# Patient Record
(demographics unavailable — no encounter records)

---

## 2024-10-22 NOTE — ASSESSMENT
[FreeTextEntry1] : 34 yo female with class 2 obesity requires medical weight loss therapy due to weight history and the positive effects weight loss can have on comorbid condition of elevated cholesterol :  -Increase Zepbound to 5mg    -Will also f/u pcp for a review of her labs  -encouraged increased PA -encouraged to increase fiber and try metamucil if constipation becomes more of an issue -Follow up 4 weeks

## 2024-10-22 NOTE — HISTORY OF PRESENT ILLNESS
[FreeTextEntry1] : Anti-obesity medications: Zepbound Obesity medication side effects:   Bariatric surgery history: no  Obesity co-morbidities: Co-morbidities improved or resolved:  highest adult weight:  250 lowest adult weight: 155  goal weight: 180-200  last weight:  247 current weight:  she doesn't weight herself  other pmhx: constipation (uses kiwi, colo wnl) depression (in therapy) pshx; c section  family: mom and dad are healthy no dm, htn  social: no smoke or drink   32 yo female with class 2 obesity presents for weight management, she started Zepbound in September.  She has a little constipation but is still going to start colace. She had a little nausea but nothing excessive. She also notes increased thirst and is drinking a lot of water. She also feels that she is losing some inches.  This morning she had eggs, spinach and yogurt and granola with fresh berries. She used to always skip her breakfast and she no longer does so. Culturally she's been trained to finish her plate always. She snacks on water/nuts/berries.  She focuses on protein.  Her grandmother passes and her daughter had a bday so she hasn't increased her PA as much as she'd like it to be but she tries/she gets up and walks around.   from my last note:  Social; sedentary, works m-f 8 to 4  Sleep: she has a small child so sleep is an issue, she snores but her  does not witness apnea, she does not wake up choking/gasping  PA: no planned activity /she used to be an athlete volleyball, track, basketball

## 2024-11-26 NOTE — ASSESSMENT
[FreeTextEntry1] : 32 yo female with class 2 obesity, BMI 37, requires medical weight loss therapy due to weight history and the positive effects weight loss can have on comorbid condition of elevated cholesterol :  - continue with Zepbound to 5mg    - f/u pcp for a review of her labs  -encouraged increased PA as she's planning to do  - miralax l for constipation  -Follow up 3 weeks

## 2024-11-26 NOTE — HISTORY OF PRESENT ILLNESS
[FreeTextEntry1] : Anti-obesity medications: Zepbound Obesity medication side effects:   Bariatric surgery history: no  Obesity co-morbidities: Co-morbidities improved or resolved:  highest adult weight:  250 lowest adult weight: 155  goal weight: 180-200  last weight:  247 current weight:  240  other pmhx: constipation (uses kiwi, colo wnl) depression (in therapy) pshx; c section  family: mom and dad are healthy no dm, htn  social: no smoke or drink   34 yo female with class 2 obesity presents for weight management, she started Zepbound in September.  She is on Zepbound 5mg and feels amazing. She has less fatigue, feels more energized, less joint pain going up and down stairs.  Her skin is glowing. She is still having some constipation and incorporated more fiber and metamucil, she's drinking. She is a little off the few days because her 's schedule is throwing her off. She walks and does some light weight lifting. She is going to invest in a rowing machine.     from my last note:  Social; sedentary, works m-f 8 to 4  Sleep: she has a small child so sleep is an issue, she snores but her  does not witness apnea, she does not wake up choking/gasping  PA: no planned activity /she used to be an athlete volleyball, track, basketball

## 2024-12-31 NOTE — ASSESSMENT
[FreeTextEntry1] : 34 yo female with class 2 obesity, BMI 37, requires medical weight loss therapy due to weight history and the positive effects weight loss can have on comorbid condition of elevated cholesterol :  - Agrees to increase Zep to 7.5mg   -encouraged increase PA - cw rowing machine  -cw miralax l for constipation  -Follow up 3 weeks (or in feb if she refills the 7.5mg dose)

## 2024-12-31 NOTE — HISTORY OF PRESENT ILLNESS
[FreeTextEntry1] : Anti-obesity medications: Zepbound Obesity medication side effects:   Bariatric surgery history: no  Obesity co-morbidities: Co-morbidities improved or resolved:  highest adult weight:  250 lowest adult weight: 155  goal weight: 180-200  last weight:  240 current weight:  237.4 other pmhx: constipation (uses kiwi, colo wnl) depression (in therapy) pshx; c section  family: mom and dad are healthy no dm, htn  social: no smoke or drink   32 yo female with class 2 obesity presents for weight management, she started Zepbound in September.  She is on Zepbound 5mg and is still doing  really well. Her last shot was about 2 weeks ago (she wanted to see me before she got a refill). She feels like she can fly up and down the steps, she takes 10 minute walks - gets up to get a patients chart or whatever excuse she has. She got a rowing machine.  Her constipation is better.   from my last note:  Social; sedentary, works m-f 8 to 4  Sleep: she has a small child so sleep is an issue, she snores but her  does not witness apnea, she does not wake up choking/gasping  PA: no planned activity /she used to be an athlete volleyball, track, basketball

## 2025-01-28 NOTE — ASSESSMENT
[FreeTextEntry1] : 32 yo female with class 2 obesity, BMI 37, requires medical weight loss therapy due to weight history and the positive effects weight loss can have on comorbid condition of elevated cholesterol :  - Agrees to continue with Zep  7.5mg  - she has a refill pending and will let me know if she wants to stay on this dose or increase to 10mg before her next visit  -encouraged to cw walking routine, rowing machine when needed  -cw lower calorie diet - follow up 6 weeks

## 2025-01-28 NOTE — HISTORY OF PRESENT ILLNESS
[FreeTextEntry1] : Anti-obesity medications: Zepbound Obesity medication side effects:   constipation Bariatric surgery history: no  Obesity co-morbidities: Co-morbidities improved or resolved:  highest adult weight:  250 lowest adult weight: 155  goal weight: 180-200  last weight:  237 current weight:  232 other pmhx: constipation (uses kiwi, colo wnl) depression (in therapy) pshx; c section  family: mom and dad are healthy no dm, htn  social: no smoke or drink   32 yo female with class 2 obesity presents for weight management, she started Zepbound in September.  She is on Zepbound 7.5mg and is doing really well. She feels great.  She has started walking every morning, during the weekdays she takes a 30-45 min walk. She got a rowing machine but somedays she doesn't get a chance to use it.   Mentally she is feeling much better.  She's increasing her fruit intake. Constipation is better with the fruit and the walking.   from my last note:  Social; sedentary, works m-f 8 to 4  Sleep: she has a small child so sleep is an issue, she snores but her  does not witness apnea, she does not wake up choking/gasping  PA: no planned activity /she used to be an athlete volleyball, track, basketball

## 2025-03-03 NOTE — END OF VISIT
[TextEntry] : IVale, am scribing for and the presence of Dr. Nighat Null the following sections HISTORY OF PRESENT ILLNESS, PAST MEDICAL/FAMILY/SOCIAL HISTORY; REVIEW OF SYSTEMS; PHYSICAL EXAM; DISPOSITION.

## 2025-03-04 NOTE — HISTORY OF PRESENT ILLNESS
[FreeTextEntry1] : Anti-obesity medications: Zepbound Obesity medication side effects:  mild  constipation Bariatric surgery history: no  Obesity co-morbidities: Co-morbidities improved or resolved:  highest adult weight:  250 goal weight: 180-200  other pmhx: constipation (uses kiwi, colo wnl) depression (in therapy) pshx; c section  family: mom and dad are healthy no dm, htn  social: no smoke or drink   34 yo female with class 2 obesity presents for weight management, she started Zepbound in September.  She is on Zepbound 7.5mg and is still doing really well. She feels great.  She has been keeping up with her protein intake, fruit and vege intake, water intake, walking more and using her rowing machine (~ 150 min a week).  Mentally she is feeling much better.   from my last note:  Social; sedentary, works m-f 8 to 4  Sleep: she has a small child so sleep is an issue, she snores but her  does not witness apnea, she does not wake up choking/gasping  PA: no planned activity /she used to be an athlete volleyball, track, basketball

## 2025-03-04 NOTE — REASON FOR VISIT
[Other Location: e.g. School (Enter Location, City,State)___] : at [unfilled], at the time of the visit. [Other Location: e.g. Home (Enter Location, City,State)___] : at [unfilled] [Patient] : the patient [This encounter was initiated by telehealth (audio with video) and converted to telephone (audio only) due to technical difficulties.] : This encounter was initiated by telehealth (audio with video) and converted to telephone (audio only) due to technical difficulties. [FreeTextEntry2] : She had issues with her video link

## 2025-03-04 NOTE — ASSESSMENT
[FreeTextEntry1] : 32 yo female with class 2 obesity, requires medical weight loss therapy due to weight history and the positive effects weight loss can have on comorbid condition of elevated cholesterol :  - Class 2 obesity e66.812 - chronic weight management BMI 38 now 35 -Improving, she's lost ~9% of her starting weight on Zepbound (24lbs, started at 247, now 224, her BMI went from 38 --> 35) --Agrees to continue with Zepbound  7.5mg - THIS IS CONTINUATION OF THERAPY  -encouraged to cw walking routine, rowing machine when needed (she is currently doing 150 minutes moderate aerobic activity a week) -cw reduced calorie diet (she was instructed not to eat less than 1200 calories a day) - elevated cholesterol - can repeat labs in a few months - follow up 4 weeks   She has not taken: Benzphetamine (Didrex) Bupropion-naltrexone (Contrave) Diethylpropion (Tenuate) Liraglutide (Saxenda) Orlistat (Pepe; Xenical) Phendimetrazine Phentermine (Adipex-P; Lomaira) Phentermine-topiramate (Qsymia) Semaglutide (Wegovy) Other (Please specify)  Zepbound will not be used in conjunction with another GLP-1 Zepbound will not be combined iwth another weight loss product  She will continue on a reduced calorie diet.  She is continuing to exercise 150 min a week or more  SHe was successful in losing 9% of her starting weight thus far.   TIme based billing reflects the time spent trying to connect, the telephone visit with the patient, the time spent preparing her PA and the time spent calling her back to discuss the progress of the pa.

## 2025-03-04 NOTE — ASSESSMENT
[FreeTextEntry1] : 34 yo female with class 2 obesity, requires medical weight loss therapy due to weight history and the positive effects weight loss can have on comorbid condition of elevated cholesterol :  - Class 2 obesity e66.812 - chronic weight management BMI 38 now 35 -Improving, she's lost ~9% of her starting weight on Zepbound (24lbs, started at 247, now 224, her BMI went from 38 --> 35) --Agrees to continue with Zepbound  7.5mg - THIS IS CONTINUATION OF THERAPY  -encouraged to cw walking routine, rowing machine when needed (she is currently doing 150 minutes moderate aerobic activity a week) -cw reduced calorie diet (she was instructed not to eat less than 1200 calories a day) - elevated cholesterol - can repeat labs in a few months - follow up 4 weeks   She has not taken: Benzphetamine (Didrex) Bupropion-naltrexone (Contrave) Diethylpropion (Tenuate) Liraglutide (Saxenda) Orlistat (Pepe; Xenical) Phendimetrazine Phentermine (Adipex-P; Lomaira) Phentermine-topiramate (Qsymia) Semaglutide (Wegovy) Other (Please specify)  Zepbound will not be used in conjunction with another GLP-1 Zepbound will not be combined iwth another weight loss product  She will continue on a reduced calorie diet.  She is continuing to exercise 150 min a week or more  SHe was successful in losing 9% of her starting weight thus far.   TIme based billing reflects the time spent trying to connect, the telephone visit with the patient, the time spent preparing her PA and the time spent calling her back to discuss the progress of the pa.

## 2025-04-29 NOTE — ASSESSMENT
[FreeTextEntry1] : 32 yo female with class 2 obesity, requires medical weight loss therapy due to weight history and the positive effects weight loss can have on comorbid condition of elevated cholesterol :  - Class 2 obesity e66.812 - improving  --Agrees to increasing to 10mg of Zepbound  -she will continue her walking routine, rowing machine when needed (she is currently doing 150 minutes moderate aerobic activity a week) -cw reduced calorie diet (she was instructed not to eat less than 1200 calories a day) - she does not use birth control, was advised to avoid pregnancy while on Zepbound - advised of possible increased s/e when the dose is titrated up   - elevated cholesterol  - minimal risk factors (no dm, no strong family history, no htn, non smoker) - will repeat lipid panel now   - follow up 8 weeks

## 2025-04-29 NOTE — HISTORY OF PRESENT ILLNESS
[FreeTextEntry1] : Anti-obesity medications: Zepbound Obesity medication side effects:  no issues  Bariatric surgery history: no  Obesity co-morbidities: Co-morbidities improved or resolved:  highest adult weight:  250 goal weight: 180-200  other pmhx: constipation (uses kiwi, colo wnl) depression (in therapy) pshx; c section  family: mom and dad are healthy no dm, htn  social: no smoke or drink   34 yo female with class 2 obesity presents for weight management. She is currently on Zepbound 7.5mg and is still doing really well. Denies side effects. She is still losing weight but it's slowed a bit. Her energy levels are very good and she feels amazing. She has been keeping up with her protein intake, fruit and vege intake, water intake, walking more and using her rowing machine (~ 150 min a week).   She bought her first pair of jeans in 6 years.   from my last note:  Social; sedentary, works m-f 8 to 4  Sleep: she has a small child so sleep is an issue, she snores but her  does not witness apnea, she does not wake up choking/gasping  PA: no planned activity /she used to be an athlete volleyball, track, basketball

## 2025-06-24 NOTE — HISTORY OF PRESENT ILLNESS
[FreeTextEntry1] : Anti-obesity medications: Zepbound Obesity medication side effects:  no issues  Bariatric surgery history: no  Obesity co-morbidities: Co-morbidities improved or resolved:  highest adult weight:  250 goal weight: 180-200  other pmhx: constipation (uses kiwi, colo wnl) depression (in therapy) pshx; c section  family: mom and dad are healthy no dm, htn  social: no smoke or drink   32 yo female with class 2 obesity presents for weight management. She is currently on Zepbound 10 mg and is s/p 2 doses. She is doing really well.   Denies side effects.  Her energy levels are still very good.  She walks and uses a rowing machine. She is looking into signing up for [employer] and plans to go twice a week.  She has been keeping up with her protein intake, fruit and vege intake, water intake.  from my last note:  Social; sedentary, works m-f 8 to 4  Sleep: she has a small child so sleep is an issue, she snores but her  does not witness apnea, she does not wake up choking/gasping  PA: no planned activity /she used to be an athlete volleyball, track, basketball

## 2025-06-24 NOTE — ASSESSMENT
[FreeTextEntry1] : 34 yo female with class 2 obesity, requires medical weight loss therapy due to weight history and the positive effects weight loss can have on comorbid condition of elevated cholesterol :  - Class 2 obesity e66.812 - improving  --Agrees to cw 10mg of Zepbound  -she will continue her walking routine, rowing machine and plans to go to [employer], start weight training -cw reduced calorie diet (she was instructed not to eat less than 1200 calories a day) - she does not use birth control, was advised to avoid pregnancy while on Zepbound - chem normal last Sept. can repeat now  - elevated cholesterol  - minimal risk factors (no dm, no strong family history, no htn, non smoker) - will repeat lipid panel now   A1c wnl last sept - no need for repeat at this time  - follow up 8 weeks